# Patient Record
Sex: OTHER/UNKNOWN | Race: WHITE | NOT HISPANIC OR LATINO | Employment: FULL TIME | URBAN - METROPOLITAN AREA
[De-identification: names, ages, dates, MRNs, and addresses within clinical notes are randomized per-mention and may not be internally consistent; named-entity substitution may affect disease eponyms.]

---

## 2017-04-26 ENCOUNTER — HOSPITAL ENCOUNTER (OUTPATIENT)
Dept: RADIOLOGY | Facility: HOSPITAL | Age: 32
Discharge: HOME | End: 2017-04-26
Attending: FAMILY MEDICINE
Payer: COMMERCIAL

## 2017-04-26 ENCOUNTER — OFFICE VISIT (OUTPATIENT)
Dept: FAMILY MEDICINE | Facility: CLINIC | Age: 32
End: 2017-04-26
Payer: COMMERCIAL

## 2017-04-26 VITALS
OXYGEN SATURATION: 97 % | WEIGHT: 200 LBS | BODY MASS INDEX: 29.62 KG/M2 | HEIGHT: 69 IN | TEMPERATURE: 98 F | DIASTOLIC BLOOD PRESSURE: 80 MMHG | SYSTOLIC BLOOD PRESSURE: 138 MMHG | HEART RATE: 80 BPM

## 2017-04-26 DIAGNOSIS — Z72.0 TOBACCO ABUSE: ICD-10-CM

## 2017-04-26 DIAGNOSIS — M79.672 BILATERAL FOOT PAIN: ICD-10-CM

## 2017-04-26 DIAGNOSIS — M79.671 BILATERAL FOOT PAIN: ICD-10-CM

## 2017-04-26 DIAGNOSIS — J02.9 SORE THROAT: Primary | ICD-10-CM

## 2017-04-26 DIAGNOSIS — I10 ESSENTIAL HYPERTENSION: ICD-10-CM

## 2017-04-26 LAB
CTP QC/QA: YES
S PYO RRNA THROAT QL PROBE: NEGATIVE

## 2017-04-26 PROCEDURE — 3075F SYST BP GE 130 - 139MM HG: CPT | Mod: S$GLB,,, | Performed by: FAMILY MEDICINE

## 2017-04-26 PROCEDURE — 87081 CULTURE SCREEN ONLY: CPT

## 2017-04-26 PROCEDURE — 99999 PR PBB SHADOW E&M-NEW PATIENT-LVL III: CPT | Mod: PBBFAC,,, | Performed by: FAMILY MEDICINE

## 2017-04-26 PROCEDURE — 73620 X-RAY EXAM OF FOOT: CPT | Mod: TC,50,PO,LT

## 2017-04-26 PROCEDURE — 1160F RVW MEDS BY RX/DR IN RCRD: CPT | Mod: S$GLB,,, | Performed by: FAMILY MEDICINE

## 2017-04-26 PROCEDURE — 93010 ELECTROCARDIOGRAM REPORT: CPT | Mod: S$GLB,,, | Performed by: INTERNAL MEDICINE

## 2017-04-26 PROCEDURE — 73620 X-RAY EXAM OF FOOT: CPT | Mod: 26,50,, | Performed by: RADIOLOGY

## 2017-04-26 PROCEDURE — 3079F DIAST BP 80-89 MM HG: CPT | Mod: S$GLB,,, | Performed by: FAMILY MEDICINE

## 2017-04-26 PROCEDURE — 93005 ELECTROCARDIOGRAM TRACING: CPT | Mod: S$GLB,,, | Performed by: FAMILY MEDICINE

## 2017-04-26 PROCEDURE — 87880 STREP A ASSAY W/OPTIC: CPT | Mod: QW,S$GLB,, | Performed by: FAMILY MEDICINE

## 2017-04-26 PROCEDURE — 99204 OFFICE O/P NEW MOD 45 MIN: CPT | Mod: S$GLB,,, | Performed by: FAMILY MEDICINE

## 2017-04-26 RX ORDER — LISINOPRIL 2.5 MG/1
2.5 TABLET ORAL DAILY
Qty: 30 TABLET | Refills: 0 | Status: SHIPPED | OUTPATIENT
Start: 2017-04-26 | End: 2018-04-26

## 2017-04-26 RX ORDER — METHYLPREDNISOLONE 4 MG/1
TABLET ORAL
Qty: 1 PACKAGE | Refills: 0 | Status: SHIPPED | OUTPATIENT
Start: 2017-04-26

## 2017-04-26 RX ORDER — BENZONATATE 200 MG/1
200 CAPSULE ORAL 3 TIMES DAILY PRN
Qty: 21 CAPSULE | Refills: 0 | Status: SHIPPED | OUTPATIENT
Start: 2017-04-26 | End: 2017-05-03

## 2017-04-26 NOTE — MR AVS SNAPSHOT
[facility]  [address]  [facility] [address]  Phone: [phone]  Fax: [phone]                  Zafar Hull   2017 11:20 AM   Office Visit    Description:  Unknown : 1985   Provider:  Claudette Bell MD   Department:  [facility]           Reason for Visit     Sore Throat     Hypertension           Diagnoses this Visit        Comments    Sore throat    -  Primary     Bilateral foot pain         Essential hypertension         Tobacco abuse                To Do List           Future Appointments        Provider Department Dept Phone    2017 7:20 AM Claudette Bell MD [facility] [phone]      Goals (5 Years of Data)     None       These Medications        Disp Refills Start End    lisinopril (PRINIVIL,ZESTRIL) 2.5 MG tablet 30 tablet 0 2017    Take 1 tablet (2.5 mg total) by mouth once daily. - Oral    Pharmacy: [facility] [address] [place] [address] Ph #: [phone]       methylPREDNISolone (MEDROL DOSEPACK) 4 mg tablet 1 Package 0 2017     use as directed    Pharmacy: [address] Ph #: [phone]       benzonatate (TESSALON) 200 MG capsule 21 capsule 0 2017 5/3/2017    Take 1 capsule (200 mg total) by mouth 3 (three) times daily as needed. - Oral    Pharmacy: [facility] [address] [place] [address] Ph #: [phone]         [facility]     [facility] Care Line -  Assistance  Unless otherwise directed by your provider, please contact [facility], our nurse care line that is available for  assistance.     Registered nurses in the [facility] provide: appointment scheduling, clinical advisement, health education, and other advisory services.  Call: [phone] (toll free)               Medications           Message regarding  "Medications     Verify the changes and/or additions to your medication regime listed below are the same as discussed with your clinician today.  If any of these changes or additions are incorrect, please notify your healthcare provider.        START taking these NEW medications        Refills    lisinopril (PRINIVIL,ZESTRIL) 2.5 MG tablet 0    Sig: Take 1 tablet (2.5 mg total) by mouth once daily.    Class: Normal    Route: Oral    methylPREDNISolone (MEDROL DOSEPACK) 4 mg tablet 0    Sig: use as directed    Class: Normal    benzonatate (TESSALON) 200 MG capsule 0    Sig: Take 1 capsule (200 mg total) by mouth 3 (three) times daily as needed.    Class: Normal    Route: Oral           Verify that the below list of medications is an accurate representation of the medications you are currently taking.  If none reported, the list may be blank. If incorrect, please contact your healthcare provider. Carry this list with you in case of emergency.           Current Medications     benzonatate (TESSALON) 200 MG capsule Take 1 capsule (200 mg total) by mouth 3 (three) times daily as needed.    lisinopril (PRINIVIL,ZESTRIL) 2.5 MG tablet Take 1 tablet (2.5 mg total) by mouth once daily.    methylPREDNISolone (MEDROL DOSEPACK) 4 mg tablet use as directed           Clinical Reference Information           Your Vitals Were     BP Pulse Temp Height Weight SpO2    138/80 80 98.1 °F (36.7 °C) (Oral) 5' 9" (1.753 m) 90.7 kg (200 lb) 97%    BMI                29.53 kg/m2          Blood Pressure          Most Recent Value    BP  138/80      Allergies as of 4/26/2017     No Known Allergies      Immunizations Administered on Date of Encounter - 4/26/2017     None      Orders Placed During Today's Visit      Normal Orders This Visit    IN OFFICE EKG 12-LEAD (to Muse)     POCT Rapid Strep A     Strep A culture, throat     Future Labs/Procedures Expected by Expires    Basic metabolic panel  4/26/2017 6/25/2018    HETEROPHILE AB SCREEN  " 4/26/2017 6/25/2018    X-Ray Foot AP Bilateral  4/26/2017 4/26/2018 4/26/2017 12:15 PM - Darby Reed LPN      Component Results     Component Value Flag Ref Range Units Status    Rapid Strep A Screen Negative  Negative  Final     Acceptable Yes    Final            [facility] Sign-Up     Activating your [facility] account is as easy as 1-2-3!     1) Visit [email or web address], select Sign Up Now, enter this activation code and your date of birth, then select Next.  [email or web address]  Expires: 6/10/2017  5:38 AM      2) Create a username and password to use when you visit [facility] in the future and select a security question in case you lose your password and select Next.    3) Enter your e-mail address and click Sign Up!    Additional Information  If you have questions, please e-mail [email or web address] or call [phone] to talk to our [facility] staff. Remember, [facility] is NOT to be used for urgent needs. For medical emergencies, dial 911.         Smoking Cessation     If you would like to quit smoking:   You may be eligible for free services if you are a [place] resident and started smoking cigarettes before September 1, 1988.  Call the Smoking Cessation Trust (SCT) toll free at (642) 923-8966 or (425) 583-2783.   Call 1-800-QUIT-NOW if you do not meet the above criteria.   Contact us via email: [email or web address]   View our website for more information: [email or web address]        Language Assistance Services     ATTENTION: Language assistance services are available, free of charge. Please call [phone].      ATENCIÓN: Si habla español, tiene a byrnes disposición servicios gratuitos de asistencia lingüística. Llame al [phone].     REHANA Ý: N?u b?n nói Ti?ng Vi?t, có các d?ch v? h? tr? ngôn ng? mi?n phí dành cho b?n. G?i s? [phone].         [facility] complies with applicable Federal civil rights laws and does not discriminate on the  basis of race, color, national origin, age, disability, or sex.

## 2017-04-28 LAB — BACTERIA THROAT CULT: NORMAL

## 2017-04-29 NOTE — PROGRESS NOTES
Subjective:       Patient ID: Zafar Hull is a 31 y.o. unknown.    Chief Complaint: Sore Throat; Hypertension; and Foot Pain    Sore Throat    This is a new problem. The current episode started in the past 7 days. The problem has been unchanged. There has been no fever. The pain is mild. Associated symptoms include swollen glands. Pertinent negatives include no abdominal pain, congestion, coughing, ear pain, headaches, neck pain, shortness of breath, trouble swallowing or vomiting. He has had no exposure to strep or mono. He has tried nothing for the symptoms.   Hypertension   This is a chronic problem. The current episode started more than 1 year ago. The problem is unchanged. The problem is uncontrolled. Pertinent negatives include no blurred vision, chest pain, headaches, neck pain, palpitations, peripheral edema or shortness of breath. Risk factors for coronary artery disease include male gender and smoking/tobacco exposure. Treatments tried: uncertain previous treatment. Compliance problems: consistent use.  There is no history of CAD/MI, left ventricular hypertrophy or a thyroid problem. There is no history of a hypertension causing med.   Foot Pain  Patient reports bilateral foot pain. Symptoms have been present chronically for several months without improvement. Wearing steel toe work boots exacerbates symptoms. He notes no trouble with ambulating. There is sensation of an ache. No trauma or injuries reported. He denies any history of gout. He has not tried measures for relief.     Past Medical History:   Diagnosis Date    Hypertension      History reviewed. No pertinent surgical history.  History reviewed. No pertinent family history.    Review of Systems   Constitutional: Positive for fatigue. Negative for appetite change, chills and fever.   HENT: Positive for sore throat. Negative for congestion, ear pain, postnasal drip, sinus pressure and trouble swallowing.    Eyes: Negative for blurred  "vision, pain and visual disturbance.   Respiratory: Negative for cough and shortness of breath.    Cardiovascular: Negative for chest pain, palpitations and leg swelling.   Gastrointestinal: Negative for abdominal pain and vomiting.   Endocrine: Negative for cold intolerance and heat intolerance.   Genitourinary: Negative for decreased urine volume and difficulty urinating.   Musculoskeletal: Positive for arthralgias. Negative for myalgias and neck pain.   Skin: Negative for color change and rash.   Allergic/Immunologic: Negative for environmental allergies.   Neurological: Negative for dizziness, weakness, numbness and headaches.   Psychiatric/Behavioral: Negative for dysphoric mood and sleep disturbance. The patient is not nervous/anxious.        Objective:   /80  Pulse 80  Temp 98.1 °F (36.7 °C) (Oral)   Ht 5' 9" (1.753 m)  Wt 90.7 kg (200 lb)  SpO2 97%  BMI 29.53 kg/m2  Physical Exam   Constitutional: He is oriented to person, place, and time. He appears well-developed and well-nourished. No distress.   HENT:   Head: Normocephalic and atraumatic.   Right Ear: Tympanic membrane, external ear and ear canal normal.   Left Ear: Tympanic membrane, external ear and ear canal normal.   Nose: Nose normal.   Mouth/Throat: Posterior oropharyngeal erythema present. No oropharyngeal exudate.   Eyes: Conjunctivae and EOM are normal. Pupils are equal, round, and reactive to light.   Neck: Normal range of motion. Neck supple.   Cardiovascular: Normal rate, regular rhythm and normal heart sounds.  Exam reveals no gallop and no friction rub.    No murmur heard.  Pulmonary/Chest: Effort normal and breath sounds normal.   Abdominal: Soft. Bowel sounds are normal.   Musculoskeletal: He exhibits no edema.        Right foot: There is normal range of motion, no tenderness and no bony tenderness.        Left foot: There is bony tenderness. There is normal range of motion, no tenderness and no swelling.        " Feet:    Bilateral pes planus   Lymphadenopathy:     He has cervical adenopathy.   Neurological: He is alert and oriented to person, place, and time.   Skin: Skin is warm and dry. No rash noted. He is not diaphoretic.   Psychiatric: He has a normal mood and affect. His behavior is normal.       Assessment:       1. Sore throat    2. Essential hypertension    3. Bilateral foot pain    4. Tobacco abuse        Plan:   Sore throat  Negative RST. Will send for throat culture. He is reporting increased fatigue from baseline. Will screen for Mono. Discussed self care for sore throat. Will provide Medrol krishna and Tessalon.   -     POCT Rapid Strep A  -     HETEROPHILE AB SCREEN; Future; Expected date: 4/26/17  -     Strep A culture, throat  -     methylPREDNISolone (MEDROL DOSEPACK) 4 mg tablet; use as directed  Dispense: 1 Package; Refill: 0  -     benzonatate (TESSALON) 200 MG capsule; Take 1 capsule (200 mg total) by mouth 3 (three) times daily as needed.  Dispense: 21 capsule; Refill: 0    Essential hypertension  EKG in office demonstrates NSR@76bpm. Will initiate low dose Lisinopril once daily. Heart healthy diet is recommended. Home BP monitoring has also been advised. Recommend maintaining BP target <140/90.  -     IN OFFICE EKG 12-LEAD (to Muse)  -     lisinopril (PRINIVIL,ZESTRIL) 2.5 MG tablet; Take 1 tablet (2.5 mg total) by mouth once daily.  Dispense: 30 tablet; Refill: 0  -     Basic metabolic panel; Future; Expected date: 4/26/17    Bilateral foot pain  Have advised consideration for orthotics. No acute findings on imaging apart from calcaneal spur. May consider Podiatry evaluation for persistent discomfort. Ok to take Aleve or Tylenol sparingly as needed.  -     X-Ray Foot AP Bilateral; Future; Expected date: 4/26/17    Tobacco abuse  Smoking cessation is advised.

## 2017-09-26 NOTE — NUR
LE 1000 UP TO BATHROOM. VOIDED. BACK IN ROOM GETTING DRESSED. 1025 DC
INSTRUCTIONS GIVEN. LEFT VIA W/C.

## 2017-09-27 NOTE — OR
[facility]
                                    [address], [place]  [address]
_________________________________________________________________________________________
                                                                 Signed   
 
 
DATE OF PROCEDURE:  09/26/17 
 
PREOPERATIVE DIAGNOSIS:  Torn medial meniscus, right knee. 
 
POSTOPERATIVE DIAGNOSIS:  Torn medial meniscus, right knee. 
 
PROCEDURE:  Right knee arthroscopy with partial medial meniscectomy.
 
SURGEON:  Andrea Khan MD.
 
ANESTHESIA:  General.
 
SPECIMENS:  None.
 
COMPLICATIONS:  None.
 
TOURNIQUET TIME:  About 25 minutes.
 
DESCRIPTION OF PROCEDURE 
The patient taken the operating room. After anesthesia was induced, airway secured, the
patient was positioned, prepped and draped in the routine sterile fashion. Leg was
exsanguinated with an Esmarch bandage. Pneumatic tourniquet was inflated to 300 mmHg
pressure. The arthroscope was inserted through the standard anterolateral portal and the
outflow cannula was placed superomedially. This lasted quite a bit of debris floating
around the knee. After we irrigated all this out, we maneuvered the scope into the
medial compartment and using a transillumination and localization with a spinal needle,
created an anteromedial portal. We then proceeded with a diagnostic arthroscopy. The
suprapatellar pouch was unremarkable. There was minimal wear at the patellofemoral
joint. The medial recess was unremarkable. The medial compartment revealed a complex
tear of the posterior horn of the medial meniscus. Intercondylar notch was unremarkable
with an intact ACL and the lateral compartment was completely unremarkable with no
abnormalities in the weightbearing surfaces nor in the lateral meniscus. The lateral
recess was also unremarkable.
 
We then returned to the medial compartment. Using a small basket forceps, we completed
the meniscal tear and then inserted the 4 mm shaver to gently shave and contour the
meniscectomy. Because of extremely tight medial compartment, we did downsize to the 3.5
mm motorized shaver to finish the trimming of the meniscectomy. Knee was copiously
irrigated and drained. The portals were closed and sterile dressings applied. The
patient was awakened, taken to recovery room, where arrived in stable condition. Counts
were correct and antibiotic protocols were followed. 
 
 
    Electronically Signed By: ANDREA KHAN MD  09/27/17 1305
_________________________________________________________________________________________
PATIENT NAME:     BRICE JOVEL                  
MEDICAL RECORD #: [id number]                     OPERATIVE REPORT              
          ACCT #: [id number]  
DATE OF BIRTH:   [birth year]                                       
PHYSICIAN:   ANDREA KHAN MD           REPORT #: [id number]
REPORT IS CONFIDENTIAL AND NOT TO BE RELEASED WITHOUT AUTHORIZATION
 
 
                                  [address]
                                  [place], [place]  [address]
_________________________________________________________________________________________
                                                                 Signed   
 
 
 
 
 
_____________________________
Andrea Khan MD
 
WFB/Modl
DD: 09/26/2017 08:25:35
DT: 09/26/2017 08:54:39
Job #: [id number]
 
 
 
 
 
 
 
 
 
 
 
 
 
 
 
 
 
 
 
 
 
 
 
 
 
 
 
 
 
 
 
 
 
    Electronically Signed By: ANDREA KHAN MD  09/27/17 1305
_________________________________________________________________________________________
PATIENT NAME:     BRICE JOVEL                  
MEDICAL RECORD #: [id number]                     OPERATIVE REPORT              
          ACCT #: [id number]  
DATE OF BIRTH:   [birth year]                                       
PHYSICIAN:   ANDREA KHAN MD           REPORT #: [id number]
REPORT IS CONFIDENTIAL AND NOT TO BE RELEASED WITHOUT AUTHORIZATION

## 2018-07-03 ENCOUNTER — HOSPITAL ENCOUNTER (EMERGENCY)
Dept: HOSPITAL 46 - ED | Age: 33
Discharge: HOME | End: 2018-07-03
Payer: COMMERCIAL

## 2018-07-03 VITALS — WEIGHT: 200 LBS | BODY MASS INDEX: 26.51 KG/M2 | HEIGHT: 73 IN

## 2018-07-03 DIAGNOSIS — Z79.899: ICD-10-CM

## 2018-07-03 DIAGNOSIS — G89.29: Primary | ICD-10-CM

## 2018-07-03 DIAGNOSIS — M79.642: ICD-10-CM

## 2023-10-10 ENCOUNTER — HOSPITAL ENCOUNTER (EMERGENCY)
Dept: HOSPITAL 46 - ED | Age: 38
Discharge: ANOTHER HOSPITAL | End: 2023-10-10
Payer: SELF-PAY

## 2023-10-10 VITALS — BODY MASS INDEX: 27.14 KG/M2 | HEIGHT: 73 IN | WEIGHT: 204.81 LBS

## 2023-10-10 VITALS — DIASTOLIC BLOOD PRESSURE: 100 MMHG | SYSTOLIC BLOOD PRESSURE: 165 MMHG

## 2023-10-10 DIAGNOSIS — D64.9: ICD-10-CM

## 2023-10-10 DIAGNOSIS — E83.52: Primary | ICD-10-CM

## 2023-10-10 LAB
ALBUMIN SERPL-MCNC: 3.2 G/DL (ref 3.4–5)
ALBUMIN SERPL-MCNC: 3.5 G/DL (ref 3.4–5)
ALBUMIN/GLOB SERPL: 0.83 {RATIO} (ref 1.1–2.4)
ALBUMIN/GLOB SERPL: 0.86 {RATIO} (ref 1.1–2.4)
ALP SERPL-CCNC: 256 U/L (ref 46–116)
ALP SERPL-CCNC: 285 U/L (ref 46–116)
ALT SERPL W P-5'-P-CCNC: 22 U/L (ref 14–59)
ALT SERPL W P-5'-P-CCNC: 23 U/L (ref 14–59)
ANION GAP SERPL CALCULATED.4IONS-SCNC: 12.5 MMOL/L (ref 7–21)
ANION GAP SERPL CALCULATED.4IONS-SCNC: 12.7 MMOL/L (ref 7–21)
ANION GAP SERPL CALCULATED.4IONS-SCNC: 14.6 MMOL/L (ref 7–21)
AST SERPL-CCNC: 34 U/L (ref 15–37)
AST SERPL-CCNC: 37 U/L (ref 15–37)
BASOPHILS NFR BLD AUTO: 2.4 % (ref 0–2)
BUN SERPL-MCNC: 47 MG/DL (ref 7–18)
BUN SERPL-MCNC: 50 MG/DL (ref 7–18)
BUN SERPL-MCNC: 52 MG/DL (ref 7–18)
BUN/CREAT SERPL: 16.31 (ref 6–28.6)
BUN/CREAT SERPL: 17.36 (ref 6–28.6)
BUN/CREAT SERPL: 17.44 (ref 6–28.6)
CALCIUM SERPL-MCNC: 15.7 MG/DL (ref 8.5–10.1)
CALCIUM SERPL-MCNC: 16.5 MG/DL (ref 8.5–10.1)
CALCIUM SERPL-MCNC: 17.3 MG/DL (ref 8.5–10.1)
CHLORIDE SERPL-SCNC: 100 MMOL/L (ref 98–107)
CHLORIDE SERPL-SCNC: 101 MMOL/L (ref 98–107)
CHLORIDE SERPL-SCNC: 98 MMOL/L (ref 98–107)
CO2 SERPL-SCNC: 27 MMOL/L (ref 21–32)
CO2 SERPL-SCNC: 28 MMOL/L (ref 21–32)
CO2 SERPL-SCNC: 29 MMOL/L (ref 21–32)
DEPRECATED RDW RBC AUTO: 18.2 FL (ref 10.5–15)
EGFRCR SERPLBLD CKD-EPI 2021: 26 ML/MIN (ref 60–?)
EGFRCR SERPLBLD CKD-EPI 2021: 27 ML/MIN (ref 60–?)
EGFRCR SERPLBLD CKD-EPI 2021: 27 ML/MIN (ref 60–?)
EOSINOPHIL NFR BLD AUTO: 2.1 % (ref 0–6)
GLOBULIN SER-MCNC: 3.7 G/DL (ref 1.8–3.5)
GLOBULIN SER-MCNC: 4.2 G/DL (ref 1.8–3.5)
HCT VFR BLD AUTO: 27.1 % (ref 35–50)
HGB BLD-MCNC: 8.9 G/DL (ref 12–18)
HGB UR QL STRIP: NEGATIVE
KETONES UR QL STRIP: NEGATIVE
LEUKOCYTE ESTERASE UR QL STRIP: NEGATIVE
LYMPHOCYTES NFR BLD AUTO: 7.4 % (ref 24–44)
MAGNESIUM SERPL-MCNC: 2.4 MG/DL (ref 1.8–2.4)
MCH RBC QN AUTO: 26.3 PG (ref 27–36)
MCHC RBC AUTO-ENTMCNC: 32.8 G/DL (ref 30–36)
MCV RBC AUTO: 80.2 FL (ref 81–99)
MONOCYTES NFR BLD AUTO: 7.7 % (ref 0–12)
NEUTROPHILS NFR BLD AUTO: 80.4 % (ref 39–80)
NITRITE UR QL STRIP: NEGATIVE
PLATELET # BLD AUTO: 344 K/UL (ref 140–440)
POTASSIUM SERPL-SCNC: 4.5 MMOL/L (ref 3.5–5.1)
POTASSIUM SERPL-SCNC: 4.6 MMOL/L (ref 3.5–5.1)
POTASSIUM SERPL-SCNC: 4.7 MMOL/L (ref 3.5–5.1)
PROT SERPL-MCNC: 6.9 G/DL (ref 6.4–8.2)
PROT SERPL-MCNC: 7.7 G/DL (ref 6.4–8.2)
RBC # BLD AUTO: 3.38 M/UL (ref 4.3–5.7)

## 2023-10-10 NOTE — XMS
PreManage Notification: BRICE JOEVL MRN:[id number]
 
Security Information
 
Security Events
No recent Security Events currently on file
 
 
 
CRITERIA MET
------------
- Group Notification
 
 
CARE PROVIDERS
There are no care providers on record at this time.
 
[facility] has no Care Guidelines for this patient.
Care History
Medical/Surgical
07/05/2018    [facility]
 
      - [facility]W unable to contact patient. Patient voicemail box has not been set up yet.
      - [facility]W trying to contact patient to set up a PCP.
      - Patient is requesting pain med refills and that needs to be done by a PCP.
      Care Recommendation: This patient has had 5 or more Emergency Department
      visits in the last 12 months.\T\nbsp; Patient requires education on the scope
      and purpose of the ED as an acute care provider not a Primary Care Provider
      and should not be utilized for chronic conditions.\T\nbsp; If patient returns
      to ED please contact [facility] at [phone]. These
      are guidelines and the provider should exercise clinical judgment when
      providing care.
E.D. VISIT COUNT (12 MO.)
-------------------------------------------------------------------------------------
1 [facility]
-------------------------------------------------------------------------------------
TOTAL 1
-------------------------------------------------------------------------------------
NOTE: Visits indicate total known visits.
 
ED/UCC VISIT TRACKING (12 MO.)
-------------------------------------------------------------------------------------
10/10/2023 13:15
[facility] [place]
 
TYPE: Emergency
 
COMPLAINT:
- LUMP ON L SIDE NECK, SOB, R ARM NUMB, LEGS TINGLY
-------------------------------------------------------------------------------------
 
 
INPATIENT VISIT TRACKING (12 MO.)
No inpatient visits to display in this time frame
 
[email or web address]

## 2023-10-10 NOTE — EKG
[facility]
                                    [address]
                                  [place], [place]  [address]
_________________________________________________________________________________________
                                                                 Draft    
 
 
EK completed, results pending confirmation
 
 
 
 
 
 
 
 
 
 
 
 
 
 
 
 
 
 
 
 
 
 
 
 
 
 
 
 
 
 
 
 
 
 
 
 
 
 
 
 
 
 
 
 
                                                                                    
_________________________________________________________________________________________
PATIENT NAME:     BRICE JOVEL               
MEDICAL RECORD #: [id number]                     Electrocardiogram             
          ACCT #: [id number]  
DATE OF BIRTH:   [birth year]                                       
PHYSICIAN:   PRELIMINARY                        REPORT #: [id number]
REPORT IS CONFIDENTIAL AND NOT TO BE RELEASED WITHOUT AUTHORIZATION